# Patient Record
Sex: MALE | Race: BLACK OR AFRICAN AMERICAN | Employment: FULL TIME | URBAN - METROPOLITAN AREA
[De-identification: names, ages, dates, MRNs, and addresses within clinical notes are randomized per-mention and may not be internally consistent; named-entity substitution may affect disease eponyms.]

---

## 2017-12-07 ENCOUNTER — HOSPITAL ENCOUNTER (OUTPATIENT)
Dept: GENERAL RADIOLOGY | Age: 37
Discharge: HOME | End: 2017-12-07
Payer: SELF-PAY

## 2017-12-07 DIAGNOSIS — Z02.1 PRE-EMPLOYMENT EXAMINATION: ICD-10-CM

## 2017-12-07 PROCEDURE — 71020 XR CHEST PA LAT: CPT

## 2022-10-18 ENCOUNTER — HOSPITAL ENCOUNTER (OUTPATIENT)
Dept: LAB | Age: 42
Discharge: HOME | End: 2022-10-18

## 2022-10-18 ENCOUNTER — OFFICE VISIT (OUTPATIENT)
Dept: INTERNAL MEDICINE CLINIC | Age: 42
End: 2022-10-18
Payer: MEDICARE

## 2022-10-18 VITALS
BODY MASS INDEX: 46.23 KG/M2 | DIASTOLIC BLOOD PRESSURE: 126 MMHG | OXYGEN SATURATION: 100 % | WEIGHT: 305 LBS | TEMPERATURE: 96.8 F | HEART RATE: 91 BPM | HEIGHT: 68 IN | SYSTOLIC BLOOD PRESSURE: 175 MMHG

## 2022-10-18 DIAGNOSIS — E66.01 CLASS 3 SEVERE OBESITY WITH SERIOUS COMORBIDITY AND BODY MASS INDEX (BMI) OF 45.0 TO 49.9 IN ADULT, UNSPECIFIED OBESITY TYPE (HCC): ICD-10-CM

## 2022-10-18 DIAGNOSIS — I10 SEVERE UNCONTROLLED HYPERTENSION: ICD-10-CM

## 2022-10-18 DIAGNOSIS — M54.12 CERVICAL RADICULOPATHY: ICD-10-CM

## 2022-10-18 DIAGNOSIS — I10 SEVERE UNCONTROLLED HYPERTENSION: Primary | ICD-10-CM

## 2022-10-18 DIAGNOSIS — M48.02 FORAMINAL STENOSIS OF CERVICAL REGION: ICD-10-CM

## 2022-10-18 LAB
ALBUMIN SERPL-MCNC: 4.2 G/DL (ref 3.4–5)
ALBUMIN/GLOB SERPL: 1.3 {RATIO} (ref 0.8–1.7)
ALP SERPL-CCNC: 60 U/L (ref 45–117)
ALT SERPL-CCNC: 30 U/L (ref 16–61)
ANION GAP SERPL CALC-SCNC: 7 MMOL/L (ref 3–18)
APPEARANCE UR: CLEAR
AST SERPL-CCNC: 18 U/L (ref 10–38)
BASOPHILS # BLD: 0 K/UL (ref 0–0.1)
BASOPHILS NFR BLD: 0 % (ref 0–2)
BILIRUB SERPL-MCNC: 0.3 MG/DL (ref 0.2–1)
BILIRUB UR QL: NEGATIVE
BUN SERPL-MCNC: 12 MG/DL (ref 7–18)
BUN/CREAT SERPL: 11 (ref 12–20)
CALCIUM SERPL-MCNC: 9.1 MG/DL (ref 8.5–10.1)
CHLORIDE SERPL-SCNC: 108 MMOL/L (ref 100–111)
CHOLEST SERPL-MCNC: 165 MG/DL
CO2 SERPL-SCNC: 25 MMOL/L (ref 21–32)
COLOR UR: YELLOW
CREAT SERPL-MCNC: 1.14 MG/DL (ref 0.6–1.3)
DIFFERENTIAL METHOD BLD: ABNORMAL
EOSINOPHIL # BLD: 0.1 K/UL (ref 0–0.4)
EOSINOPHIL NFR BLD: 2 % (ref 0–5)
ERYTHROCYTE [DISTWIDTH] IN BLOOD BY AUTOMATED COUNT: 14 % (ref 11.6–14.5)
EST. AVERAGE GLUCOSE BLD GHB EST-MCNC: 114 MG/DL
GLOBULIN SER CALC-MCNC: 3.2 G/DL (ref 2–4)
GLUCOSE SERPL-MCNC: 74 MG/DL (ref 74–99)
GLUCOSE UR STRIP.AUTO-MCNC: NEGATIVE MG/DL
HBA1C MFR BLD: 5.6 % (ref 4.2–5.6)
HCT VFR BLD AUTO: 44.8 % (ref 36–48)
HDLC SERPL-MCNC: 49 MG/DL (ref 40–60)
HDLC SERPL: 3.4 {RATIO} (ref 0–5)
HGB BLD-MCNC: 14.5 G/DL (ref 13–16)
HGB UR QL STRIP: NEGATIVE
IMM GRANULOCYTES # BLD AUTO: 0 K/UL (ref 0–0.04)
IMM GRANULOCYTES NFR BLD AUTO: 1 % (ref 0–0.5)
KETONES UR QL STRIP.AUTO: NEGATIVE MG/DL
LDLC SERPL CALC-MCNC: 99.2 MG/DL (ref 0–100)
LEUKOCYTE ESTERASE UR QL STRIP.AUTO: NEGATIVE
LIPID PROFILE,FLP: NORMAL
LYMPHOCYTES # BLD: 1.5 K/UL (ref 0.9–3.6)
LYMPHOCYTES NFR BLD: 20 % (ref 21–52)
MCH RBC QN AUTO: 27.4 PG (ref 24–34)
MCHC RBC AUTO-ENTMCNC: 32.4 G/DL (ref 31–37)
MCV RBC AUTO: 84.5 FL (ref 78–100)
MONOCYTES # BLD: 0.6 K/UL (ref 0.05–1.2)
MONOCYTES NFR BLD: 7 % (ref 3–10)
NEUTS SEG # BLD: 5.5 K/UL (ref 1.8–8)
NEUTS SEG NFR BLD: 70 % (ref 40–73)
NITRITE UR QL STRIP.AUTO: NEGATIVE
NRBC # BLD: 0 K/UL (ref 0–0.01)
NRBC BLD-RTO: 0 PER 100 WBC
PH UR STRIP: 5 [PH] (ref 5–8)
PLATELET # BLD AUTO: 317 K/UL (ref 135–420)
PMV BLD AUTO: 9.9 FL (ref 9.2–11.8)
POTASSIUM SERPL-SCNC: 4.1 MMOL/L (ref 3.5–5.5)
PROT SERPL-MCNC: 7.4 G/DL (ref 6.4–8.2)
PROT UR STRIP-MCNC: NEGATIVE MG/DL
RBC # BLD AUTO: 5.3 M/UL (ref 4.35–5.65)
SODIUM SERPL-SCNC: 140 MMOL/L (ref 136–145)
SP GR UR REFRACTOMETRY: 1.02 (ref 1–1.03)
T4 FREE SERPL-MCNC: 1 NG/DL (ref 0.7–1.5)
TRIGL SERPL-MCNC: 84 MG/DL (ref ?–150)
TSH SERPL DL<=0.05 MIU/L-ACNC: 0.46 UIU/ML (ref 0.36–3.74)
UROBILINOGEN UR QL STRIP.AUTO: 0.2 EU/DL (ref 0.2–1)
VLDLC SERPL CALC-MCNC: 16.8 MG/DL
WBC # BLD AUTO: 7.8 K/UL (ref 4.6–13.2)

## 2022-10-18 PROCEDURE — 36415 COLL VENOUS BLD VENIPUNCTURE: CPT

## 2022-10-18 PROCEDURE — 80053 COMPREHEN METABOLIC PANEL: CPT

## 2022-10-18 PROCEDURE — 83036 HEMOGLOBIN GLYCOSYLATED A1C: CPT

## 2022-10-18 PROCEDURE — 84439 ASSAY OF FREE THYROXINE: CPT

## 2022-10-18 PROCEDURE — 84443 ASSAY THYROID STIM HORMONE: CPT

## 2022-10-18 PROCEDURE — 81003 URINALYSIS AUTO W/O SCOPE: CPT

## 2022-10-18 PROCEDURE — 80061 LIPID PANEL: CPT

## 2022-10-18 PROCEDURE — 85025 COMPLETE CBC W/AUTO DIFF WBC: CPT

## 2022-10-18 PROCEDURE — 99204 OFFICE O/P NEW MOD 45 MIN: CPT | Performed by: NURSE PRACTITIONER

## 2022-10-18 RX ORDER — LOSARTAN POTASSIUM AND HYDROCHLOROTHIAZIDE 12.5; 5 MG/1; MG/1
1 TABLET ORAL DAILY
Qty: 30 TABLET | Refills: 0 | Status: SHIPPED | OUTPATIENT
Start: 2022-10-18

## 2022-10-18 RX ORDER — GABAPENTIN 100 MG/1
100 CAPSULE ORAL 3 TIMES DAILY
Qty: 30 CAPSULE | Refills: 0 | Status: SHIPPED | OUTPATIENT
Start: 2022-10-18

## 2022-10-18 RX ORDER — CYCLOBENZAPRINE HCL 5 MG
5 TABLET ORAL
Qty: 30 TABLET | Refills: 0 | Status: SHIPPED | OUTPATIENT
Start: 2022-10-18 | End: 2022-10-18 | Stop reason: ALTCHOICE

## 2022-10-18 NOTE — PROGRESS NOTES
HISTORY OF PRESENT ILLNESS  Yoan Bush is a 40 y.o. male. Here to establish care with PMHX HTN  HPI  1) HTN - no meds  Current smoking black and milds - 2-3 a day  Alcohol drink occasional   NSAIDS use prn  Wt Readings from Last 3 Encounters:   10/18/22 305 lb (138.3 kg)     BP Readings from Last 3 Encounters:   10/18/22 (!) 175/126      2) Cervical pain - Left arm - gets weak, tingling occurs for a day or two - comes and goes - had EMG study but did not have results - pain in neck makes head hurt as well - Pain level 6/10 described as tingling in hand and shooting, sharp pain down arm. BP (!) 175/126   Pulse 91   Temp 96.8 °F (36 °C)   Ht 5' 8\" (1.727 m)   Wt 305 lb (138.3 kg)   SpO2 100%   BMI 46.38 kg/m²     Review of Systems   Constitutional:  Negative for chills, fever and malaise/fatigue. Eyes:  Positive for blurred vision and double vision. Respiratory:  Negative for cough, shortness of breath and wheezing. Cardiovascular:  Negative for chest pain, palpitations and leg swelling. Genitourinary:  Negative for frequency. Neurological:  Negative for dizziness and headaches. Endo/Heme/Allergies:  Negative for polydipsia. Physical Exam  Vitals and nursing note reviewed. Constitutional:       General: He is not in acute distress. Appearance: He is obese. He is not ill-appearing. HENT:      Head: Normocephalic. Right Ear: Tympanic membrane, ear canal and external ear normal.      Left Ear: Tympanic membrane, ear canal and external ear normal.      Nose: Nose normal.      Mouth/Throat:      Mouth: Mucous membranes are moist.      Pharynx: Oropharynx is clear. Comments: MASK  Eyes:      General: No scleral icterus. Extraocular Movements: Extraocular movements intact. Conjunctiva/sclera: Conjunctivae normal.      Pupils: Pupils are equal, round, and reactive to light. Cardiovascular:      Rate and Rhythm: Normal rate and regular rhythm.       Pulses: Normal pulses. Heart sounds: Normal heart sounds. Pulmonary:      Effort: Pulmonary effort is normal. No respiratory distress. Breath sounds: Normal breath sounds. No wheezing. Abdominal:      General: Abdomen is flat. Bowel sounds are normal. There is no distension. Palpations: Abdomen is soft. There is no mass. Tenderness: There is no abdominal tenderness. There is no guarding or rebound. Hernia: No hernia is present. Musculoskeletal:         General: Normal range of motion. Cervical back: Normal range of motion. Right lower leg: No edema. Left lower leg: No edema. Lymphadenopathy:      Cervical: No cervical adenopathy. Skin:     General: Skin is warm and dry. Findings: No lesion or rash. Neurological:      General: No focal deficit present. Mental Status: He is alert and oriented to person, place, and time. Sensory: Sensory deficit (C6-C7 left dermatone) present. Motor: Weakness (LUE) present. Psychiatric:         Mood and Affect: Mood normal.         Behavior: Behavior normal.         Thought Content: Thought content normal.         Judgment: Judgment normal.       ASSESSMENT and PLAN  Diagnoses and all orders for this visit:    1. Severe uncontrolled hypertension  -     REFERRAL TO OPHTHALMOLOGY  -     losartan-hydroCHLOROthiazide (HYZAAR) 50-12.5 mg per tablet; Take 1 Tablet by mouth daily.  -     METABOLIC PANEL, COMPREHENSIVE; Future  -     CBC WITH AUTOMATED DIFF; Future  -     LIPID PANEL; Future  -     T4, FREE; Future  -     TSH 3RD GENERATION; Future  -     URINALYSIS W/ RFLX MICROSCOPIC; Future  -     REFERRAL TO DIETITIAN    2. Class 3 severe obesity with serious comorbidity and body mass index (BMI) of 45.0 to 49.9 in adult, unspecified obesity type ([facility] [address]  -     HEMOGLOBIN A1C WITH EAG; Future  -     REFERRAL TO DIETITIAN    3.  Cervical radiculopathy  -     REFERRAL TO PHYSICIAL MEDICINE REHAB  -     gabapentin (NEURONTIN) 100 mg capsule; Take 1 Capsule by mouth three (3) times daily. Max Daily Amount: 300 mg. Indications: neuropathic pain    4. Foraminal stenosis of cervical region  -     REFERRAL TO PHYSICIAL MEDICINE REHAB  -     gabapentin (NEURONTIN) 100 mg capsule; Take 1 Capsule by mouth three (3) times daily. Max Daily Amount: 300 mg. Indications: neuropathic pain  HTN - not controlled - started losartan-HCTZ RTC in 4 weeks for evaluation - handout given on HTN  Referral to nutrition   Referral to ortho for cervical pain and radiculopathy - trial gabapentin QHS for pain   Follow-up and Dispositions    Return in about 4 weeks (around 11/15/2022) for HTN.

## 2022-10-18 NOTE — PROGRESS NOTES
Do Morgan is a 40 y.o. male (: 1980) presenting to address:    Chief Complaint   Patient presents with    New Patient              Vitals:    10/18/22 1012   Temp: 96.8 °F (36 °C)   Weight: 305 lb (138.3 kg)   Height: 5' 8\" (1.727 m)   PainSc:   0 - No pain       Hearing/Vision:   No results found. Learning Assessment:   No flowsheet data found. Depression Screening:     3 most recent PHQ Screens 10/18/2022   Little interest or pleasure in doing things Not at all   Feeling down, depressed, irritable, or hopeless Not at all   Total Score PHQ 2 0   Trouble falling or staying asleep, or sleeping too much Not at all   Feeling tired or having little energy Not at all   Poor appetite, weight loss, or overeating Not at all   Feeling bad about yourself - or that you are a failure or have let yourself or your family down Not at all   Trouble concentrating on things such as school, work, reading, or watching TV Not at all   Moving or speaking so slowly that other people could have noticed; or the opposite being so fidgety that others notice Not at all   Thoughts of being better off dead, or hurting yourself in some way Not at all   PHQ 9 Score 0   How difficult have these problems made it for you to do your work, take care of your home and get along with others Not difficult at all     Fall Risk Assessment:   No flowsheet data found. Abuse Screening:   No flowsheet data found. ADL Assessment:   No flowsheet data found. Coordination of Care Questionaire:   1. \"Have you been to the ER, urgent care clinic since your last visit? Hospitalized since your last visit? \" No    2. \"Have you seen or consulted any other health care providers outside of the [address] [place] [address] since your last visit? \" No     3. For patients aged 39-70: Has the patient had a colonoscopy / FIT/ Cologuard? NA - based on age      If the patient is female:    4.  For patients aged 41-77: Has the patient had a mammogram within the past 2 years? NA - based on age or sex  See top three    5. For patients aged 21-65: Has the patient had a pap smear? NA - based on age or sex    Advanced Directive:   1. Do you have an Advanced Directive? NO    2. Would you like information on Advanced Directives?  NO

## 2022-10-25 ENCOUNTER — TELEPHONE (OUTPATIENT)
Dept: INTERNAL MEDICINE CLINIC | Age: 42
End: 2022-10-25

## 2022-10-25 NOTE — TELEPHONE ENCOUNTER
----- Message from Wali Zheng sent at 10/24/2022  9:41 AM EDT -----  Subject: Message to Provider    QUESTIONS  Information for Provider? Patient had lost some [employer] ear buds(gray) in   the exam room on 10/18. He wants to know if anyone found them  ---------------------------------------------------------------------------  --------------  Abhishek MATOS  [phone]; OK to leave message on voicemail  ---------------------------------------------------------------------------  --------------  SCRIPT ANSWERS  Relationship to Patient?  Self

## 2022-11-11 DIAGNOSIS — I10 SEVERE UNCONTROLLED HYPERTENSION: ICD-10-CM

## 2022-11-14 RX ORDER — LOSARTAN POTASSIUM AND HYDROCHLOROTHIAZIDE 12.5; 5 MG/1; MG/1
TABLET ORAL
Qty: 30 TABLET | Refills: 0 | Status: SHIPPED | OUTPATIENT
Start: 2022-11-14

## 2022-12-13 ENCOUNTER — OFFICE VISIT (OUTPATIENT)
Dept: ORTHOPEDIC SURGERY | Age: 42
End: 2022-12-13
Payer: MEDICARE

## 2022-12-13 VITALS
HEIGHT: 68 IN | HEART RATE: 87 BPM | TEMPERATURE: 97.2 F | SYSTOLIC BLOOD PRESSURE: 141 MMHG | BODY MASS INDEX: 43.95 KG/M2 | OXYGEN SATURATION: 98 % | DIASTOLIC BLOOD PRESSURE: 102 MMHG | WEIGHT: 290 LBS

## 2022-12-13 DIAGNOSIS — R20.0 NUMBNESS AND TINGLING OF LEFT UPPER EXTREMITY: ICD-10-CM

## 2022-12-13 DIAGNOSIS — R20.2 NUMBNESS AND TINGLING OF LEFT UPPER EXTREMITY: ICD-10-CM

## 2022-12-13 DIAGNOSIS — M79.18 MYOFASCIAL PAIN: ICD-10-CM

## 2022-12-13 DIAGNOSIS — M67.912 TENDINOPATHY OF LEFT ROTATOR CUFF: ICD-10-CM

## 2022-12-13 DIAGNOSIS — M54.2 NECK PAIN: Primary | ICD-10-CM

## 2022-12-13 DIAGNOSIS — M54.2 NECK PAIN: ICD-10-CM

## 2022-12-13 RX ORDER — CYCLOBENZAPRINE HCL 5 MG
5 TABLET ORAL
COMMUNITY
Start: 2022-10-18

## 2022-12-13 RX ORDER — PREDNISONE 10 MG/1
10 TABLET ORAL SEE ADMIN INSTRUCTIONS
Qty: 1 DOSE PACK | Refills: 0 | Status: SHIPPED | OUTPATIENT
Start: 2022-12-13 | End: 2022-12-19

## 2022-12-13 NOTE — PROGRESS NOTES
[address]  [address], [address]  [place], [address] [place] [address] Street  Phone: [phone]  Fax: [phone]           Eduardo Wylie  : 1980  PCP: Earlene Dallas NP  2022    NEW PATIENT    HISTORY OF PRESENT ILLNESS  Jacqueline Bashir is a 40 y.o. male c/o neck pain that radiates intermittently down left arm and intermittent weakness of left hand. Notes of numbness and tingling when he raises his left arm. Reports he sleeps on his left side. When he rotates his neck, he notes of pain that radiates across whole neck. Notes of intermittent radiation of pain from neck to left hand, where second, third, and fourth digit experience the worse sxs. Pt noted that he had a Cervical MRI performed in  in [place], and he gave results to his PCP. We do not have the results on file, but pt brought in the physical films to examine today during OV. Pt notes that he saw a spine specialist in [place] in 2021, who was planning to have an BETZY performed for pt, but pt didn't hear anything back from them. Pt notes that when he went to the ED for chest pain on 21, he was told he may have nerve damage. Cervical MRI images dated 21. Per report , mild degenerative changes, no high-grade stenosis, no obvious acute abnormalities. Pt notes that he used to smoke a lot, but now he is gradually cutting down on smoking. Notes he was previously given a low dose of Gabapentin to take, but he doesn't remember the dose he was at. Pain Score: 8/10     PmHx: HTN      ASSESSMENT  Jacqueline Bashir is a 40 y.o. male with neck pain, left shoulder pain, and intermittent radiation to left hand in second, third, and fourth digit His symptoms may be due to a potential rotator cuff tendinopathy of left shoulder, with compensatory regional myofascial pain. He has a positive Hawkin's sign of L. There is weakness of his left ulnar ADM and slight muscle atrophy of left FDI.  The left hand is his dominant side. We will evaluate for ulnar mononeuropathy through LUE EMG for symptoms of numbness and tingling at pinky. Otherwise, patient is neurologically intact with normal strength and sensation. PLAN  LUE EMG  Prednisone 10 mg steroid pack x 6 days  Advised pt to perform rotator cuff exercises at home to relieve myofascial pain of left shoulder    Pt will f/u for EMG or sooner if needed. Diagnoses and all orders for this visit:    1. Neck pain    2. Numbness and tingling of left upper extremity    3. Tendinopathy of left rotator cuff    4. Myofascial pain         Follow-up and Dispositions    Return in about 6 weeks (around 1/24/2023) for LUE EMG followup. CHIEF COMPLAINT  Tereso Hudson is seen today in consultation at the request of Vasu Amaya NP for complaints of neck pain, let shoulder pain and numbness and tingling of LUE. PAST MEDICAL HISTORY   Past Medical History:   Diagnosis Date    HTN (hypertension)        History reviewed. No pertinent surgical history. MEDICATIONS      Current Outpatient Medications   Medication Sig Dispense Refill    cyclobenzaprine (FLEXERIL) 5 mg tablet Take 5 mg by mouth nightly. losartan-hydroCHLOROthiazide (HYZAAR) 50-12.5 mg per tablet TAKE 1 TABLET BY MOUTH EVERY DAY 30 Tablet 0    gabapentin (NEURONTIN) 100 mg capsule Take 1 Capsule by mouth three (3) times daily. Max Daily Amount: 300 mg.  Indications: neuropathic pain 30 Capsule 0       ALLERGIES    No Known Allergies       SOCIAL HISTORY    Social History     Socioeconomic History    Marital status: SINGLE   Tobacco Use    Smoking status: Every Day     Types: Cigarettes    Smokeless tobacco: Never   Vaping Use    Vaping Use: Never used   Substance and Sexual Activity    Alcohol use: Not Currently    Drug use: Not Currently    Sexual activity: Yes       FAMILY HISTORY    Family History   Problem Relation Age of Onset    Heart Disease Mother        REVIEW OF SYSTEMS  Review of Systems   Constitutional:  Negative for chills, fever and weight loss. Respiratory:  Negative for shortness of breath. Cardiovascular:  Negative for chest pain. Gastrointestinal:  Negative for constipation. Negative for fecal incontinence   Genitourinary:  Negative for dysuria. Negative for urinary incontinence   Musculoskeletal:  Positive for myalgias and neck pain (intermittent radiation down LUE). Skin:  Negative for rash. Neurological:  Positive for tingling (LUE). Negative for dizziness, tremors, focal weakness and headaches. Endo/Heme/Allergies:  Does not bruise/bleed easily. Psychiatric/Behavioral:  The patient does not have insomnia. PHYSICAL EXAMINATION  Visit Vitals  BP (!) 141/102 (BP 1 Location: Right upper arm, BP Patient Position: Sitting, BP Cuff Size: Large adult) Comment: pt symptomatic, MD aware   Pulse 87   Temp 97.2 °F (36.2 °C) (Temporal)   Ht 5' 8\" (1.727 m)   Wt 290 lb (131.5 kg)   SpO2 98%   BMI 44.09 kg/m²       Pain Assessment  12/13/2022   Location of Pain Neck; Shoulder;Arm   Location Modifiers Left   Severity of Pain 8   Quality of Pain Sharp;Burning; Other (Comment)   Quality of Pain Comment Numbness, Tingling   Duration of Pain Persistent   Frequency of Pain Constant   Date Pain First Started 1/12/2021   Aggravating Factors Stretching;Straightening;Bending; Other (Comment)   Aggravating Factors Comment Lying Down   Limiting Behavior Yes   Relieving Factors Nothing   Result of Injury No       Constitutional:  Well developed, well nourished, in no acute distress. Psychiatric: Affect and mood are appropriate. HEENT: Normocephalic, atraumatic. Extraocular movements intact. Integumentary: No rashes or abrasions noted on exposed areas. Cardiovascular: Regular rate and rhythm. Pulmonary: Clear to auscultation bilaterally. SPINE/MUSCULOSKELETAL EXAM    Cervical spine:  Neck is midline. Normal muscle tone. No focal atrophy is noted.    ROM pain upon right cervical ROM. Shoulder ROM intact. Negative Spurling's sign. Negative Tinel's sign. Negative Umanzor's sign. Sensation in the bilateral arms grossly intact to light touch. Other  Positive segundo' sign of L  Motor weakness of left ulnar ADM and slight atrophy of FDM    MOTOR:      Elbow Flex  Elbow Ext Arm Abd Wrist Ext Wrist Flex Hand Intrin   Right 5/5 5/5 5/5 5/5 5/5 5/5   Left 5/5 5/5 5/5 5/5 5/5 5/5             Hip flex  Knee Ext EHL Ankle DF Ankle PF      Right 5/5 5/5 5/5 5/5 5/5    Left 5/5 5/5 5/5 5/5 5/5      Negative Straight Leg raise. Squat not tested. No difficulty with tandem gait. Ambulation without assistive device. FWB. RADIOGRAPHS  Cervical MRI images taken on 9/24/22 were reviewed:  FINDINGS:   Vertebral bodies: Subtle concavity predominantly of the inferior endplates of multiple cervical vertebral bodies, predominantly centrally and posteriorly, probably reflext variation of normal anatomy. Subtle multilevel chronic compression deformities probably les likely as the etiology for this appearance. Marrow signal slightly decreased on the T1 sequences, though probably within. Disc spaces: Mild disc desiccation within cervical segments without significant loss    Craniocervical Junction/Cervical spinal cord: Right cerebellar tonsil terminates the level of foramen magnum, significant herniation. Cord signal within normal limits. Soft tissues: Unremarkable. LEVELS:    C2-3: Mild left facet arthropathy. No significant stenosis. C3-4:  Mild left facet arthropathy. No significant stenosis. C4-5: Trace disc osteophyte complex. Mild bilateral uncovertebral joint hypertrophy. Mild left facet arthropathy. No significant stenosis. C5-6: Mild facet arthropathy and uncovertebral joint hypertrophy. No signal stenosis. C6-7: Mild facet arthropathy. Mild uncovertebral joint hypertrophy. No significant stenosis.  Tiny left perineural cyst. C7-T1: Mild facet arthropathy. Mild right greater than left uncovertebral joint hypertrophy. No significant stenosis. IMPRESSION:  Mild degenerative changes. No high-grade stenosis. No obvious acute abnormalities. 31 minutes of face-to-face contact were spent with the patient during today's visit extensively discussing symptoms and treatment plan. All questions were answered. More than half of this visit today was spent on counseling. Written by Forest Byrd, as dictated by Dr. Sheba Okeefe.

## 2022-12-17 DIAGNOSIS — I10 SEVERE UNCONTROLLED HYPERTENSION: ICD-10-CM

## 2022-12-19 RX ORDER — LOSARTAN POTASSIUM AND HYDROCHLOROTHIAZIDE 12.5; 5 MG/1; MG/1
TABLET ORAL
Qty: 30 TABLET | Refills: 0 | Status: SHIPPED | OUTPATIENT
Start: 2022-12-19 | End: 2022-12-22 | Stop reason: SDUPTHER

## 2022-12-22 ENCOUNTER — OFFICE VISIT (OUTPATIENT)
Dept: INTERNAL MEDICINE CLINIC | Age: 42
End: 2022-12-22
Payer: MEDICARE

## 2022-12-22 VITALS
HEART RATE: 76 BPM | OXYGEN SATURATION: 96 % | RESPIRATION RATE: 18 BRPM | HEIGHT: 68 IN | WEIGHT: 303.4 LBS | SYSTOLIC BLOOD PRESSURE: 128 MMHG | BODY MASS INDEX: 45.98 KG/M2 | TEMPERATURE: 96.8 F | DIASTOLIC BLOOD PRESSURE: 90 MMHG

## 2022-12-22 DIAGNOSIS — Z23 ENCOUNTER FOR IMMUNIZATION: ICD-10-CM

## 2022-12-22 DIAGNOSIS — I10 SEVERE UNCONTROLLED HYPERTENSION: ICD-10-CM

## 2022-12-22 DIAGNOSIS — E78.5 HYPERLIPIDEMIA, UNSPECIFIED HYPERLIPIDEMIA TYPE: ICD-10-CM

## 2022-12-22 DIAGNOSIS — M48.02 FORAMINAL STENOSIS OF CERVICAL REGION: ICD-10-CM

## 2022-12-22 DIAGNOSIS — I10 ESSENTIAL HYPERTENSION: Primary | ICD-10-CM

## 2022-12-22 DIAGNOSIS — I25.10 ASCVD (ARTERIOSCLEROTIC CARDIOVASCULAR DISEASE): ICD-10-CM

## 2022-12-22 DIAGNOSIS — M54.12 CERVICAL RADICULOPATHY: ICD-10-CM

## 2022-12-22 DIAGNOSIS — Z11.59 ENCOUNTER FOR HEPATITIS C SCREENING TEST FOR LOW RISK PATIENT: ICD-10-CM

## 2022-12-22 PROCEDURE — G0009 ADMIN PNEUMOCOCCAL VACCINE: HCPCS | Performed by: INTERNAL MEDICINE

## 2022-12-22 PROCEDURE — G8417 CALC BMI ABV UP PARAM F/U: HCPCS | Performed by: INTERNAL MEDICINE

## 2022-12-22 PROCEDURE — G8432 DEP SCR NOT DOC, RNG: HCPCS | Performed by: INTERNAL MEDICINE

## 2022-12-22 PROCEDURE — 99204 OFFICE O/P NEW MOD 45 MIN: CPT | Performed by: INTERNAL MEDICINE

## 2022-12-22 PROCEDURE — 3080F DIAST BP >= 90 MM HG: CPT | Performed by: INTERNAL MEDICINE

## 2022-12-22 PROCEDURE — 3074F SYST BP LT 130 MM HG: CPT | Performed by: INTERNAL MEDICINE

## 2022-12-22 PROCEDURE — 90677 PCV20 VACCINE IM: CPT | Performed by: INTERNAL MEDICINE

## 2022-12-22 PROCEDURE — G8427 DOCREV CUR MEDS BY ELIG CLIN: HCPCS | Performed by: INTERNAL MEDICINE

## 2022-12-22 RX ORDER — LOSARTAN POTASSIUM AND HYDROCHLOROTHIAZIDE 12.5; 5 MG/1; MG/1
1 TABLET ORAL DAILY
Qty: 90 TABLET | Refills: 1 | Status: SHIPPED | OUTPATIENT
Start: 2022-12-22

## 2022-12-22 RX ORDER — ATORVASTATIN CALCIUM 20 MG/1
20 TABLET, FILM COATED ORAL DAILY
Qty: 90 TABLET | Refills: 1 | Status: SHIPPED | OUTPATIENT
Start: 2022-12-22

## 2022-12-22 RX ORDER — GABAPENTIN 100 MG/1
100 CAPSULE ORAL 3 TIMES DAILY
Qty: 30 CAPSULE | Refills: 0 | Status: CANCELLED | OUTPATIENT
Start: 2022-12-22

## 2022-12-22 RX ORDER — CYCLOBENZAPRINE HCL 5 MG
5 TABLET ORAL
Status: CANCELLED | OUTPATIENT
Start: 2022-12-22

## 2022-12-22 NOTE — PROGRESS NOTES
HISTORY OF PRESENT ILLNESS  Reed Mesa is a 40 y.o. male. Patient comes to establish PCP. Patient will continue to follow-up with me since prior provider will change practice. He has no complaints or concerns. Essential hypertension  On losartan/hydrochlorothiazide. Recently diagnosed, started on medication. Blood pressure improved compared to get last appointment but still not optimal.  Will check blood pressure, let me know in a few days. Hyperlipidemia  ASCVD recommend mod/high dose statins. Started atorvastatin 20 mg. Left shoulder, likely nerve compression related  Gabapentin and flexeril did not help. Now following spine doctor. Tobacco abuse disorder/former smoker  Started to stop smoking in 10/2022. 1 ppd (25 years total). Now smoking 1/2 ppd. Will go down to 1/2 pp week from today. No willing to try meds or nicotine patches/gum yet. Obesity morbid  BMI 46. 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 He will ask for advice. Information provided. PAST MEDICAL HISTORY  Medical: HTN. Surgical:  Allergies:  Medication:  Family: father and grandfather COPD and lung cancer and smoker. Work:  Social:  Sexual:          Hypertension   Pertinent negatives include no chest pain, no palpitations and no shortness of breath. Review of Systems   Constitutional:  Negative for chills and fever. HENT:  Negative for congestion. Respiratory:  Negative for cough and shortness of breath. Cardiovascular:  Negative for chest pain, palpitations and leg swelling. Visit Vitals  BP (!) 128/90 (BP 1 Location: Left upper arm, BP Patient Position: Sitting)   Pulse 76   Temp 96.8 °F (36 °C) (Temporal)   Resp 18   Ht 5' 8\" (1.727 m)   Wt 303 lb 6.4 oz (137.6 kg)   SpO2 96%   BMI 46.13 kg/m²     Physical Exam  Constitutional:       Appearance: Normal appearance. HENT:      Mouth/Throat:      Mouth: Mucous membranes are moist.      Pharynx: Oropharynx is clear.    Eyes:      Extraocular Movements: Extraocular movements intact. Conjunctiva/sclera: Conjunctivae normal.      Pupils: Pupils are equal, round, and reactive to light. Cardiovascular:      Rate and Rhythm: Normal rate and regular rhythm. Pulses: Normal pulses. Heart sounds: Normal heart sounds. Pulmonary:      Effort: Pulmonary effort is normal.      Breath sounds: Normal breath sounds. Abdominal:      General: Bowel sounds are normal.      Palpations: Abdomen is soft. Musculoskeletal:      Cervical back: Normal range of motion. Lymphadenopathy:      Cervical: No cervical adenopathy. Skin:     General: Skin is warm. Neurological:      Mental Status: He is alert and oriented to person, place, and time. Psychiatric:         Mood and Affect: Mood normal.       ASSESSMENT and PLAN    ICD-10-CM ICD-9-CM    1. Hyperlipidemia, unspecified hyperlipidemia type  E78.5 272.4       2. Cervical radiculopathy  M54.12 723.4       3. Foraminal stenosis of cervical region  M48.02 723.0       4. Severe uncontrolled hypertension  I10 401.9 losartan-hydroCHLOROthiazide (HYZAAR) 50-12.5 mg per tablet      5. ASCVD (arteriosclerotic cardiovascular disease)  I25.10 429.2 atorvastatin (LIPITOR) 20 mg tablet     440.9       6. Encounter for immunization  Z23 V03.89 PNEUMOCOCCAL, PCV20, PREVNAR 20, (AGE 18 YRS+), IM, PF      7. Encounter for hepatitis C screening test for low risk patient  Z11.59 V73.89 HEPATITIS C AB        Follow-up and Dispositions    Return in about 3 months (around 3/22/2023) for weight, HTN. Patient comes to establish PCP. ASCVD recommend mod/high dose statins. Started atorvastatin, refilled. Pneumococcal vaccine today. Screen test.    Follow-up in 3 months for weight control and smoking cessation as well as monitoring hypertension.

## 2022-12-22 NOTE — PROGRESS NOTES
Scott Barnes is a 40 y.o.  male presents today for office visit for   Chief Complaint   Patient presents with    Hypertension   . Pt is not fasting. Patient is accompanied by self. I have received verbal consent from Scott Barnes to discuss any/all medical information while they are present in the room 9. Scott Barnes preferred language for health care discussion is english. Is the patient using any DME equipment during OV? NO    Advance Directive:  1. Do you have an advance directive in place? Patient Reply: No    2. If not, would you like material regarding how to put one in place? No      1. \"Have you been to the ER, urgent care clinic since your last visit? Hospitalized since your last visit? \" No    2. \"Have you seen or consulted any other health care providers outside of the [address] [place] [address] since your last visit? \" Yes Eye surgeon and Spine doctor      3. For patients aged 39-70: Has the patient had a colonoscopy? No     If the patient is female:    4. For patients aged 41-77: Has the patient had a mammogram within the past 2 years? NA - based on age    11. For patients aged 21-65: Has the patient had a pap smear? NA - based on age    Upcoming Appts  No    VORB: No orders of the defined types were placed in this encounter.  Stormy Yu MD/[facility] [place]    Scott Barnes is due for:   Health Maintenance Due   Topic    Hepatitis C Screening     COVID-19 Vaccine (1)    Pneumococcal 0-64 years (1 - PCV)    DTaP/Tdap/Td series (1 - Tdap)    Flu Vaccine (1)    Medicare Yearly Exam      Health Maintenance reviewed and discussed per provider  Please order/place referral if appropriate. Requested Prescriptions     Pending Prescriptions Disp Refills    cyclobenzaprine (FLEXERIL) 5 mg tablet       Sig: Take 1 Tablet by mouth nightly.    gabapentin (NEURONTIN) 100 mg capsule 30 Capsule 0     Sig: Take 1 Capsule by mouth three (3) times daily. Max Daily Amount: 300 mg.  Indications: neuropathic pain       Learning Assessment:  No flowsheet data found. Depression Screening:  3 most recent PHQ Screens 12/22/2022 10/18/2022   Little interest or pleasure in doing things Not at all Not at all   Feeling down, depressed, irritable, or hopeless Not at all Not at all   Total Score PHQ 2 0 0   Trouble falling or staying asleep, or sleeping too much Not at all Not at all   Feeling tired or having little energy Not at all Not at all   Poor appetite, weight loss, or overeating Not at all Not at all   Feeling bad about yourself - or that you are a failure or have let yourself or your family down Not at all Not at all   Trouble concentrating on things such as school, work, reading, or watching TV Not at all Not at all   Moving or speaking so slowly that other people could have noticed; or the opposite being so fidgety that others notice Not at all Not at all   Thoughts of being better off dead, or hurting yourself in some way Not at all Not at all   PHQ 9 Score 0 0   How difficult have these problems made it for you to do your work, take care of your home and get along with others Not difficult at all Not difficult at all     Abuse Screening:  Abuse Screening Questionnaire 12/22/2022   Do you ever feel afraid of your partner? N   Are you in a relationship with someone who physically or mentally threatens you? N   Is it safe for you to go home? Y     Fall Risk  No flowsheet data found. Recent Travel Screening and Travel History documentation     Travel Screening       Question Response    In the last 10 days, have you been in contact with someone who was confirmed or suspected to have Coronavirus/COVID-19? Yes    Have you had a COVID-19 viral test in the last 10 days? No    Do you have any of the following new or worsening symptoms? None of these    Have you traveled internationally or domestically in the last month?  No          Travel History   Travel since 11/22/22    No documented travel since 11/22/22 Payal Booth is a 40 y.o. male who presents for Pneumococcal immunization. He denies any symptoms , reactions or allergies that would exclude them from being immunized today. Risks and adverse reactions were discussed and the VIS was given to them. All questions were addressed. He was observed for 15 min post injection. There were no reactions observed.     Holley Peraza

## 2022-12-22 NOTE — PATIENT INSTRUCTIONS
I suggest that you continue to implement healthy lifestyle changes with modifications in your diet by cutting out refined sugars, substituting white bread for whole wheat and eating more fresh vegetables. In addition you should work on getting 150 min of aerobic exercise a week, which is 30 minutes walking daily.

## 2023-03-15 ENCOUNTER — PROCEDURE VISIT (OUTPATIENT)
Age: 43
End: 2023-03-15

## 2023-03-15 VITALS
TEMPERATURE: 97 F | HEART RATE: 88 BPM | WEIGHT: 303 LBS | BODY MASS INDEX: 45.92 KG/M2 | HEIGHT: 68 IN | OXYGEN SATURATION: 99 %

## 2023-03-15 DIAGNOSIS — G56.02 CARPAL TUNNEL SYNDROME OF LEFT WRIST: ICD-10-CM

## 2023-03-15 DIAGNOSIS — R20.2 NUMBNESS AND TINGLING OF LEFT UPPER EXTREMITY: Primary | ICD-10-CM

## 2023-03-15 DIAGNOSIS — R20.0 NUMBNESS AND TINGLING OF LEFT UPPER EXTREMITY: Primary | ICD-10-CM

## 2023-03-15 DIAGNOSIS — M67.912 TENDINOPATHY OF LEFT ROTATOR CUFF: ICD-10-CM

## 2023-03-15 DIAGNOSIS — R94.131 ABNORMAL EMG: ICD-10-CM

## 2023-03-15 DIAGNOSIS — M54.2 CERVICAL PAIN: ICD-10-CM

## 2023-03-15 DIAGNOSIS — M79.18 MYOFASCIAL PAIN: ICD-10-CM

## 2023-03-15 RX ORDER — AMLODIPINE BESYLATE 5 MG/1
TABLET ORAL DAILY
COMMUNITY
Start: 2021-08-02

## 2023-03-15 ASSESSMENT — PATIENT HEALTH QUESTIONNAIRE - PHQ9
SUM OF ALL RESPONSES TO PHQ QUESTIONS 1-9: 0
SUM OF ALL RESPONSES TO PHQ9 QUESTIONS 1 & 2: 0
SUM OF ALL RESPONSES TO PHQ QUESTIONS 1-9: 0
1. LITTLE INTEREST OR PLEASURE IN DOING THINGS: 0
2. FEELING DOWN, DEPRESSED OR HOPELESS: 0

## 2023-03-15 NOTE — PROGRESS NOTES
[address]  [address], [address]  [address]  Phone: [phone]  Fax: [phone]    Carla Leon  : 1980  PCP: Santos Moore MD  3/15/2023      ELECTROMYOGRAPHY AND NERVE CONDUCTION STUDIES     Avis Alonso was referred by me for electrodiagnostic evaluation of numbness and tingling of LUE. NCV & EMG Findings:  Evaluation of the left median-ulnar (dig IV) sensory nerve showed abnormal peak latency difference ((Median Wrist)-(Ulnar Wrist), 0.70 ms). All remaining nerves (as indicated in the following tables) were within normal limits. INTERPRETATION  This is an abnormal electrodiagnostic examination. These findings may be consistent with:  Mild median mononeuropathy at the left wrist (carpal tunnel syndrome)      There are no electrodiagnostic findings consistent with cervical radiculopathy, brachial plexopathy, myopathy, polyneuropathy or any other mononeuropathy. CLINICAL INTERPRETATION/ASSESSMENT  His electrodiagnostic finding of left median mononeuropathy at the wrist appears consistent with his left arm symptoms. Avis Alonso is a 44 y.o. male with neck pain, left shoulder pain, and intermittent radiation to left hand in second, third, and fourth digit. His symptoms may be due to a potential left rotator cuff tendinopathy, with compensatory regional myofascial pain. He presents with positive Hawkin's sign of L. There is weakness of his left ulnar ADM and slight muscle atrophy of left FDI. We ruled out ulnar mononeuropathy through EMG evaluation today, but findings did reveal left CTS. PLAN  Referral to Dr. Larry Desai - eval of numbness and tingling of LUE; further eval of left CTS  Referral to PT - eval and treat of neck, left shoulder, and LUE pain  We may further evaluate through updated Cervical MRI, as most recent MRI was in 2021.      Follow-up and Dispositions    Return in about 8 weeks (around 5/10/2023) for PT follow up. Shahbaz Sanchez was seen today for arm pain. Diagnoses and all orders for this visit:    Numbness and tingling of left upper extremity    Cervical pain    Myofascial pain    Abnormal EMG    Carpal tunnel syndrome of left wrist    Tendinopathy of left rotator cuff       HISTORY OF PRESENT ILLNESS     12/13/22  Neck pain that radiates intermittently down left arm and intermittent weakness of left hand  Cervical MRI images dated 9/24/21. Per report, mild degenerative changes, no high-grade stenosis, no obvious acute abnormalities. Pt notes that he used to smoke a lot, but now he is gradually cutting  down on smoking. Notes he was previously given a low dose of Gabapentin to take, but he doesn't remember the dose he was at. Arsenio Osuna is a 44 y.o. male who presents today with LUE EMG evaluation for numbness and tingling of his left hand, especially in second, third and fourth digits. Notes onset of numbness and tingling occurs when driving or when he lifts his left arm above his head. His left hand is his dominant hand. PmHx: HTN     PAST MEDICAL HISTORY   No past medical history on file. No past surgical history on file. MEDICATIONS      Current Outpatient Medications   Medication Sig Dispense Refill    amLODIPine (NORVASC) 5 MG tablet Take by mouth daily      atorvastatin (LIPITOR) 20 MG tablet Take 20 mg by mouth daily      losartan-hydroCHLOROthiazide (HYZAAR) 50-12.5 MG per tablet Take 1 tablet by mouth daily       No current facility-administered medications for this visit.        ALLERGIES    No Known Allergies       SOCIAL HISTORY    Social History     Socioeconomic History    Marital status:      Spouse name: None    Number of children: None    Years of education: None    Highest education level: None   Tobacco Use    Smoking status: Some Days     Types: Cigarettes     Passive exposure: Never    Smokeless tobacco: Never     Social Determinants of Health Financial Resource Strain: Low Risk     Difficulty of Paying Living Expenses: Not hard at all   Food Insecurity: No Food Insecurity    Worried About Running Out of Food in the Last Year: Never true    Ran Out of Food in the Last Year: Never true       FAMILY HISTORY    No family history on file. PHYSICAL EXAMINATION  Pulse 88   Temp 97 °F (36.1 °C) (Temporal)   Ht 5' 8\" (1.727 m)   Wt (!) 303 lb (137.4 kg)   SpO2 99%   BMI 46.07 kg/m²        AMB PAIN ASSESSMENT 3/15/2023   Location of Pain Arm   Severity of Pain 5   Quality of Pain Throbbing   Duration of Pain Persistent   Frequency of Pain Constant   Aggravating Factors Other (Comment)   Limiting Behavior Some   Relieving Factors Other (Comment)   Result of Injury No   Work-Related Injury No   Are there other pain locations you wish to document? No             Constitutional:  Well developed, well nourished, in no acute distress. Psychiatric: Affect and mood are appropriate. Integumentary: No rashes or abrasions noted on exposed areas.          SPINE/MUSCULOSKELETAL EXAM     On brief examination:  Positive Hawkin's sign of L.        NCV & EMG Findings:  NCS+  Motor Nerve Results      Latency Amplitude F-Lat Segment Distance CV Comment   Site (ms) Norm (mV) Norm (ms)  (cm) (m/s) Norm    Left Median (APB) Motor   Wrist 3.9  < 4.6 9.5  > 4.2  Wrist-APB 8      Elbow 9.0 - 8.4 -  Elbow-Wrist 27 53  > 47    Left Ulnar (ADM) Motor   Wrist 2.8  < 3.7 15.5  > 7.9  Wrist-ADM 8      Bel Elbow 6.8 - 12.3 -  Bel Elbow-Wrist 23.5 59  > 52    Abv Elbow 8.2 - 14.6 -  Abv Elbow-Bel Elbow 10 71  > 43      Sensory Sites       Latency (Onset) Latency (Peak)  Amplitude (O-P) Segment Distance CV (Onset) Comment   Site ms Norm (ms) Norm µV Norm  mm m/s Norm    Left Median Sensory   Wrist-Dig II 2.6  < 3.3 3.4  < 4.0 23  > 11 Wrist-Dig II 14 54 -    Left Median-Ulnar (Dig IV) Sensory        Median   Wrist-Dig IV 3.0 - 3.6  < 4.1 8 - Wrist-Dig IV 14 47 -         Ulnar Wrist-Dig IV 2.2 - 2.9  < 3.9 7 - Wrist-Dig IV 14 64 -    Left Radial Sensory   Forearm-Wrist 1.60  < 2.2 2.1  < 2.8 44  > 7 Forearm-Wrist 10 63 -    Left Ulnar Sensory   Wrist-Dig V 2.2  < 3.1 2.8  < 4.0 26  > 11 Wrist-Dig V 14 64 -      Inter-Nerve Comparisons     Nerve 1 Value 1 Nerve 2 Value 2 Parameter Result Normal   Sensory Sites   L Median Wrist-Dig IV 3.6 ms L Ulnar Wrist-Dig IV 2.9 ms Peak Lat Diff 0.70 ms <0.40     EMG+     Side Muscle Nerve Root Ins Act Fibs Psw Fascics Other Amp Dur Poly Recrt Activation Comment Misc   Left Biceps Musculocut C5-C6 Nml Nml Nml Nml 0 Nml Nml 0 Nml Nml     Left Triceps Radial C6-C8 Nml Nml Nml Nml 0 Nml Nml 0 Nml Nml     Left Pronator Teres Median C6-C7 Nml Nml Nml Nml 0 Nml Nml 0 Nml Nml     Left FDI Median,  Ulnar C8-T1 Nml Nml Nml Nml 0 Nml Nml 0 Nml Nml     Left APB Median C8-T1 Nml Nml Nml Nml 0 Nml Nml 0 Nml Nml     Left EDC Radial,  Posterior In. ..  C7-C8 Nml Nml Nml Nml 0 Nml Nml 0 Nml Nml     Left Cervical Parasp (Upper) Rami C1-C3 Nml Nml Nml Nml 0          Left Cervical Parasp (Mid) Rami C4-C6 Nml Nml Nml Nml 0          Left Cervical Parasp (Lower) Rami C7-C8 Nml Nml Nml Nml 0                  Waveforms:    Motor         Sensory                              [facility]  OFFICE PROCEDURE PROGRESS NOTE           Chart reviewed for the following:              Donald CONNER, have reviewed the History, Physical and updated the Allergic reactions for [address] performed immediately prior to start of procedure:              Donald CONNER, have performed the following reviews on [address] prior to the start of the procedure:            * Patient was identified by name and date of birth   * Agreement on procedure being performed was verified  * Risks and Benefits explained to the patient  * Procedure site verified and marked as necessary  * Patient was positioned for comfort  * Consent was signed and verified                Time:  11:34 AM      Date of procedure: 3/15/2023      Procedure performed by:  Key Arteaga MD     Patient accompanied by another individual: No    How tolerated by patient: tolerated the procedure well with no complications    Post Procedural Pain Scale: 0 - No Hurt    Comments: none     Written by Krista Perera as dictated by Lesvia Baker MD

## 2023-04-21 ENCOUNTER — OFFICE VISIT (OUTPATIENT)
Facility: CLINIC | Age: 43
End: 2023-04-21
Payer: COMMERCIAL

## 2023-04-21 VITALS
DIASTOLIC BLOOD PRESSURE: 81 MMHG | TEMPERATURE: 97 F | HEART RATE: 77 BPM | HEIGHT: 68 IN | WEIGHT: 305.4 LBS | OXYGEN SATURATION: 93 % | SYSTOLIC BLOOD PRESSURE: 128 MMHG | BODY MASS INDEX: 46.29 KG/M2 | RESPIRATION RATE: 18 BRPM

## 2023-04-21 DIAGNOSIS — E78.5 HYPERLIPIDEMIA, UNSPECIFIED HYPERLIPIDEMIA TYPE: ICD-10-CM

## 2023-04-21 DIAGNOSIS — E66.01 OBESITY, CLASS III, BMI 40-49.9 (MORBID OBESITY) (HCC): ICD-10-CM

## 2023-04-21 DIAGNOSIS — I10 ESSENTIAL HYPERTENSION: Primary | ICD-10-CM

## 2023-04-21 DIAGNOSIS — Z11.59 ENCOUNTER FOR HEPATITIS C SCREENING TEST FOR LOW RISK PATIENT: ICD-10-CM

## 2023-04-21 DIAGNOSIS — Z11.4 SCREENING FOR HIV (HUMAN IMMUNODEFICIENCY VIRUS): ICD-10-CM

## 2023-04-21 DIAGNOSIS — Z23 ENCOUNTER FOR VACCINATION: ICD-10-CM

## 2023-04-21 PROCEDURE — 3079F DIAST BP 80-89 MM HG: CPT | Performed by: INTERNAL MEDICINE

## 2023-04-21 PROCEDURE — 90715 TDAP VACCINE 7 YRS/> IM: CPT | Performed by: INTERNAL MEDICINE

## 2023-04-21 PROCEDURE — 99214 OFFICE O/P EST MOD 30 MIN: CPT | Performed by: INTERNAL MEDICINE

## 2023-04-21 PROCEDURE — 3074F SYST BP LT 130 MM HG: CPT | Performed by: INTERNAL MEDICINE

## 2023-04-21 PROCEDURE — 90471 IMMUNIZATION ADMIN: CPT | Performed by: INTERNAL MEDICINE

## 2023-04-21 RX ORDER — AMLODIPINE, VALSARTAN AND HYDROCHLOROTHIAZIDE 10; 160; 12.5 MG/1; MG/1; MG/1
1 TABLET ORAL DAILY
Qty: 90 TABLET | Refills: 0 | Status: SHIPPED | OUTPATIENT
Start: 2023-04-21 | End: 2023-04-21

## 2023-04-21 RX ORDER — ROSUVASTATIN CALCIUM 10 MG/1
10 TABLET, COATED ORAL DAILY
Qty: 90 TABLET | Refills: 0 | Status: SHIPPED | OUTPATIENT
Start: 2023-04-21 | End: 2023-04-21

## 2023-04-21 RX ORDER — LOSARTAN POTASSIUM AND HYDROCHLOROTHIAZIDE 12.5; 5 MG/1; MG/1
1 TABLET ORAL DAILY
Qty: 90 TABLET | Refills: 3 | Status: SHIPPED | OUTPATIENT
Start: 2023-04-21

## 2023-04-21 RX ORDER — AMLODIPINE BESYLATE 5 MG/1
5 TABLET ORAL DAILY
Qty: 90 TABLET | Refills: 3 | Status: SHIPPED | OUTPATIENT
Start: 2023-04-21

## 2023-04-21 RX ORDER — VARDENAFIL HYDROCHLORIDE 5 MG/1
5 TABLET ORAL PRN
Qty: 10 TABLET | Refills: 1 | Status: SHIPPED | OUTPATIENT
Start: 2023-04-21 | End: 2023-04-21

## 2023-04-21 RX ORDER — ATORVASTATIN CALCIUM 20 MG/1
20 TABLET, FILM COATED ORAL DAILY
Qty: 90 TABLET | Refills: 3 | Status: SHIPPED | OUTPATIENT
Start: 2023-04-21

## 2023-04-21 SDOH — ECONOMIC STABILITY: FOOD INSECURITY: WITHIN THE PAST 12 MONTHS, THE FOOD YOU BOUGHT JUST DIDN'T LAST AND YOU DIDN'T HAVE MONEY TO GET MORE.: NEVER TRUE

## 2023-04-21 SDOH — ECONOMIC STABILITY: FOOD INSECURITY: WITHIN THE PAST 12 MONTHS, YOU WORRIED THAT YOUR FOOD WOULD RUN OUT BEFORE YOU GOT MONEY TO BUY MORE.: NEVER TRUE

## 2023-04-21 SDOH — ECONOMIC STABILITY: HOUSING INSECURITY
IN THE LAST 12 MONTHS, WAS THERE A TIME WHEN YOU DID NOT HAVE A STEADY PLACE TO SLEEP OR SLEPT IN A SHELTER (INCLUDING NOW)?: NO

## 2023-04-21 SDOH — ECONOMIC STABILITY: INCOME INSECURITY: HOW HARD IS IT FOR YOU TO PAY FOR THE VERY BASICS LIKE FOOD, HOUSING, MEDICAL CARE, AND HEATING?: NOT HARD AT ALL

## 2023-04-21 NOTE — PROGRESS NOTES
Raven Albright is a 44 y.o.  male presents today for office visit for   Chief Complaint   Patient presents with    Health Maintenance    Hypertension   . 1. \"Have you been to the ER, urgent care clinic since your last visit? Hospitalized since your last visit? \" No    2. \"Have you seen or consulted any other health care providers outside of the [address] [place] [address] since your last visit? \" No     3. For patients aged 39-70: Has the patient had a colonoscopy / FIT/ Cologuard? N/A     If the patient is female:    4. For patients aged 41-77: Has the patient had a mammogram within the past 2 years? N/A    5. For patients aged 21-65: Has the patient had a pap smear?  N/A
Z11.4 HIV 1/2 Ag/Ab, 4TH Generation,W Rflx Confirm     HIV 1/2 Ag/Ab, 4TH Generation,W Rflx Confirm      5. Encounter for hepatitis C screening test for low risk patient  Z11.59 Hepatitis C Antibody     Hepatitis C Antibody      6. Obesity, Class III, BMI 40-49.9 (morbid obesity) ([facility] [address]  E66.01 [facility] Prashanth Dsouza CNP, Weight Loss, [place]              Plan:   Return in about 6 months (around 10/21/2023) for Annual exam, Refills.       Yemi Galvan MD

## 2023-04-22 LAB
HCV IGG SERPL QL IA: NON REACTIVE
HIV 1+2 AB+HIV1 P24 AG SERPL QL IA: NON REACTIVE

## 2023-07-28 ENCOUNTER — OFFICE VISIT (OUTPATIENT)
Age: 43
End: 2023-07-28
Payer: COMMERCIAL

## 2023-07-28 VITALS
SYSTOLIC BLOOD PRESSURE: 135 MMHG | HEIGHT: 68 IN | TEMPERATURE: 98 F | RESPIRATION RATE: 16 BRPM | OXYGEN SATURATION: 94 % | DIASTOLIC BLOOD PRESSURE: 85 MMHG | HEART RATE: 66 BPM | WEIGHT: 303 LBS | BODY MASS INDEX: 45.92 KG/M2

## 2023-07-28 DIAGNOSIS — E78.5 HYPERLIPIDEMIA, UNSPECIFIED HYPERLIPIDEMIA TYPE: ICD-10-CM

## 2023-07-28 DIAGNOSIS — Z71.89 ENCOUNTER FOR PRE-BARIATRIC SURGERY COUNSELING AND EDUCATION: ICD-10-CM

## 2023-07-28 DIAGNOSIS — Z72.0 TOBACCO CONSUMPTION: ICD-10-CM

## 2023-07-28 DIAGNOSIS — E66.01 MORBID OBESITY (HCC): Primary | ICD-10-CM

## 2023-07-28 DIAGNOSIS — I10 HYPERTENSION, UNSPECIFIED TYPE: ICD-10-CM

## 2023-07-28 DIAGNOSIS — K21.9 GASTROESOPHAGEAL REFLUX DISEASE, UNSPECIFIED WHETHER ESOPHAGITIS PRESENT: ICD-10-CM

## 2023-07-28 PROCEDURE — 3075F SYST BP GE 130 - 139MM HG: CPT | Performed by: SURGERY

## 2023-07-28 PROCEDURE — 99204 OFFICE O/P NEW MOD 45 MIN: CPT | Performed by: SURGERY

## 2023-07-28 PROCEDURE — 3079F DIAST BP 80-89 MM HG: CPT | Performed by: SURGERY

## 2023-07-28 NOTE — PROGRESS NOTES
Viri Crowell is a 44 y.o. male (: 1980)     Chief Complaint   Patient presents with    New Patient     Bariatric consult       Medication list and allergies have been reviewed with Viri Crowell and updated as of today's date. I have gone over all Medical, Surgical and Social History with Viri Socrates and updated/added the information accordingly.

## 2024-02-08 ENCOUNTER — OFFICE VISIT (OUTPATIENT)
Facility: CLINIC | Age: 44
End: 2024-02-08
Payer: COMMERCIAL

## 2024-02-08 VITALS
TEMPERATURE: 96.9 F | BODY MASS INDEX: 46.07 KG/M2 | HEIGHT: 68 IN | HEART RATE: 99 BPM | OXYGEN SATURATION: 97 % | SYSTOLIC BLOOD PRESSURE: 153 MMHG | RESPIRATION RATE: 15 BRPM | WEIGHT: 304 LBS | DIASTOLIC BLOOD PRESSURE: 92 MMHG

## 2024-02-08 DIAGNOSIS — G89.29 CHRONIC LEFT SHOULDER PAIN: Primary | ICD-10-CM

## 2024-02-08 DIAGNOSIS — M25.512 CHRONIC LEFT SHOULDER PAIN: Primary | ICD-10-CM

## 2024-02-08 PROCEDURE — 3077F SYST BP >= 140 MM HG: CPT | Performed by: INTERNAL MEDICINE

## 2024-02-08 PROCEDURE — 99212 OFFICE O/P EST SF 10 MIN: CPT | Performed by: INTERNAL MEDICINE

## 2024-02-08 PROCEDURE — 3080F DIAST BP >= 90 MM HG: CPT | Performed by: INTERNAL MEDICINE

## 2024-02-08 ASSESSMENT — PATIENT HEALTH QUESTIONNAIRE - PHQ9
SUM OF ALL RESPONSES TO PHQ QUESTIONS 1-9: 0
1. LITTLE INTEREST OR PLEASURE IN DOING THINGS: 0
SUM OF ALL RESPONSES TO PHQ QUESTIONS 1-9: 0
SUM OF ALL RESPONSES TO PHQ9 QUESTIONS 1 & 2: 0
2. FEELING DOWN, DEPRESSED OR HOPELESS: 0
SUM OF ALL RESPONSES TO PHQ QUESTIONS 1-9: 0
SUM OF ALL RESPONSES TO PHQ QUESTIONS 1-9: 0

## 2024-02-08 ASSESSMENT — ENCOUNTER SYMPTOMS: BACK PAIN: 1

## 2024-02-08 NOTE — PROGRESS NOTES
\"Have you been to the ER, urgent care clinic since your last visit?  Hospitalized since your last visit?\"    YES - When: approximately 1 months ago.  Where and Why: [facility] for back pain and neck pain.    “Have you seen or consulted any other health care providers outside of [facility] since your last visit?”    NO        
pain and neck pain.       Objective:   Visit Vitals  BP (!) 153/92 (Site: Right Upper Arm, Position: Sitting, Cuff Size: Large Adult)   Pulse 99   Temp 96.9 °F (36.1 °C) (Temporal)   Resp 15   Ht 1.727 m (5' 8\")   Wt (!) 137.9 kg (304 lb)   SpO2 97%   BMI 46.22 kg/m²        Physical Exam  Cardiovascular:      Rate and Rhythm: Normal rate and regular rhythm.   Pulmonary:      Effort: Pulmonary effort is normal.      Breath sounds: Normal breath sounds.   Abdominal:      General: Abdomen is flat. Bowel sounds are normal.   Musculoskeletal:        Arms:       Comments: Tenderness anterior aspect of left shoulder.    Neurological:      Mental Status: He is alert.         Assessment:       ICD-10-CM    1. Chronic left shoulder pain  M25.512 [facility] - Manuel Gonzalez MD, Orthopedic Surgery(General/Shoulder & Elbow), [facility])    G89.29               Plan:   Return in about 3 months (around 5/8/2024) for Annual exam or before as needed.      Manuel Parker MD

## 2024-04-16 SDOH — HEALTH STABILITY: PHYSICAL HEALTH: ON AVERAGE, HOW MANY MINUTES DO YOU ENGAGE IN EXERCISE AT THIS LEVEL?: 20 MIN

## 2024-04-17 ENCOUNTER — OFFICE VISIT (OUTPATIENT)
Age: 44
End: 2024-04-17
Payer: COMMERCIAL

## 2024-04-17 VITALS — BODY MASS INDEX: 46.22 KG/M2 | HEIGHT: 68 IN | RESPIRATION RATE: 16 BRPM

## 2024-04-17 DIAGNOSIS — G89.29 CHRONIC LEFT SHOULDER PAIN: ICD-10-CM

## 2024-04-17 DIAGNOSIS — M75.102 ROTATOR CUFF SYNDROME, LEFT: Primary | ICD-10-CM

## 2024-04-17 DIAGNOSIS — M25.512 CHRONIC LEFT SHOULDER PAIN: ICD-10-CM

## 2024-04-17 DIAGNOSIS — M54.12 CERVICAL RADICULOPATHY: ICD-10-CM

## 2024-04-17 PROCEDURE — 20610 DRAIN/INJ JOINT/BURSA W/O US: CPT | Performed by: ORTHOPAEDIC SURGERY

## 2024-04-17 PROCEDURE — 99214 OFFICE O/P EST MOD 30 MIN: CPT | Performed by: ORTHOPAEDIC SURGERY

## 2024-04-17 PROCEDURE — 73030 X-RAY EXAM OF SHOULDER: CPT | Performed by: ORTHOPAEDIC SURGERY

## 2024-04-17 RX ORDER — LIDOCAINE HYDROCHLORIDE 10 MG/ML
3 INJECTION, SOLUTION INFILTRATION; PERINEURAL ONCE
Status: COMPLETED | OUTPATIENT
Start: 2024-04-17 | End: 2024-04-17

## 2024-04-17 RX ORDER — TRIAMCINOLONE ACETONIDE 40 MG/ML
40 INJECTION, SUSPENSION INTRA-ARTICULAR; INTRAMUSCULAR ONCE
Status: COMPLETED | OUTPATIENT
Start: 2024-04-17 | End: 2024-04-17

## 2024-04-17 RX ADMIN — TRIAMCINOLONE ACETONIDE 40 MG: 40 INJECTION, SUSPENSION INTRA-ARTICULAR; INTRAMUSCULAR at 09:27

## 2024-04-17 RX ADMIN — LIDOCAINE HYDROCHLORIDE 3 ML: 10 INJECTION, SOLUTION INFILTRATION; PERINEURAL at 09:26

## 2024-04-17 NOTE — PROGRESS NOTES
Cuff Pain/Weakness   Supra  -   +   Infra  -   +   Subscap -   -  Crepitus  -   -  Effusion  -   -  Warmth  -   -   Erythema  -   -  Instability  -   -  AC Joint TTP  -   -  Clavicle   Deformity -   -   TTP  -   -  Proximal Humerus   Deformity -   -   TTP  -   -  Deltoid Strength 5   5  Biceps Strength 5   5  Biceps Deformity -   -  Biceps Groove Pain -   -  Impingement Sign -   -       IMAGING:  Imaging read by myself and interpreted as follows:  4 view x-rays of the left shoulder were taken in the office today which are normal.    C-spine x-rays were reviewed which do not show any significant arthritic change    An MRI of the cervical spine was also reviewed which does not show any significant neural stenosis.  This is an MRI from January 2023    EMG of the left upper extremity by Dr. De La Rosa was also reviewed which shows mild left median neuropathy    ASSESSMENT & PLAN  Diagnosis: Left arm cervical radiculopathy, left shoulder rotator cuff pain    Grover Red has 3 years of left arm and left shoulder pain.  Some of his symptoms seem to be coming from cervical radiculopathy.  The other symptoms do seem to relate to his shoulder.  We discussed the treatment options for this today at length.  I recommend he take oral anti-inflammatories for both.  I also recommend that we try corticosteroid injection and home exercises.  I would like to see him back in 6 weeks to see how he responds.  If his shoulder pain gets better then I think most of this will likely be coming from his neck.  If his shoulder pain does not improve we would consider an MRI of his shoulder.    Prescription medication management discussed.     Plan was discussed in detail with patient, all questions were answered, and patient voiced understanding of plan.    [facility]  OFFICE PROCEDURE PROGRESS NOTE        Chart reviewed for the following:   I, Manuel Gonzalez MD, have reviewed the History, Physical and updated the Allergic

## 2024-04-19 ENCOUNTER — TELEPHONE (OUTPATIENT)
Age: 44
End: 2024-04-19

## 2024-04-19 NOTE — TELEPHONE ENCOUNTER
Patient is asking for a work status update after 4/17 visit regarding his shoulder.  Please contact him at [phone] to advise.

## 2024-04-25 ENCOUNTER — TELEPHONE (OUTPATIENT)
Facility: CLINIC | Age: 44
End: 2024-04-25

## 2024-04-25 NOTE — TELEPHONE ENCOUNTER
Patient needs letter stating he is no longer on a 15 lb weight restriction for his job. Patient needs this by Monday if possible please.

## 2024-04-26 NOTE — TELEPHONE ENCOUNTER
Patient is returning a call to the office.  States he was already seen by Ortho, but Deanna told him he needs to get the work note from hs PCP since he was then one who put him on restrictions.

## 2024-04-29 DIAGNOSIS — I10 ESSENTIAL HYPERTENSION: ICD-10-CM

## 2024-04-30 DIAGNOSIS — E78.5 HYPERLIPIDEMIA, UNSPECIFIED HYPERLIPIDEMIA TYPE: ICD-10-CM

## 2024-04-30 DIAGNOSIS — I10 ESSENTIAL HYPERTENSION: ICD-10-CM

## 2024-04-30 RX ORDER — AMLODIPINE BESYLATE 5 MG/1
5 TABLET ORAL DAILY
Qty: 90 TABLET | Refills: 0 | OUTPATIENT
Start: 2024-04-30

## 2024-04-30 RX ORDER — ATORVASTATIN CALCIUM 20 MG/1
20 TABLET, FILM COATED ORAL DAILY
Qty: 90 TABLET | Refills: 3 | OUTPATIENT
Start: 2024-04-30

## 2024-04-30 RX ORDER — LOSARTAN POTASSIUM AND HYDROCHLOROTHIAZIDE 12.5; 5 MG/1; MG/1
1 TABLET ORAL DAILY
Qty: 90 TABLET | Refills: 3 | OUTPATIENT
Start: 2024-04-30

## 2024-04-30 NOTE — TELEPHONE ENCOUNTER
Last Appointment:  2/8/2024  Future Appointments   Date Time Provider Department Center   5/16/2024 11:30 AM Manuel Alfredo MD [facility] AMB   5/29/2024  9:00 AM Manuel Gonzalez MD [facility] AMB

## 2024-06-05 ENCOUNTER — OFFICE VISIT (OUTPATIENT)
Age: 44
End: 2024-06-05
Payer: COMMERCIAL

## 2024-06-05 VITALS — BODY MASS INDEX: 46.22 KG/M2 | RESPIRATION RATE: 16 BRPM | HEIGHT: 68 IN

## 2024-06-05 DIAGNOSIS — M75.102 ROTATOR CUFF SYNDROME, LEFT: Primary | ICD-10-CM

## 2024-06-05 PROCEDURE — 99213 OFFICE O/P EST LOW 20 MIN: CPT | Performed by: ORTHOPAEDIC SURGERY

## 2024-06-05 NOTE — PROGRESS NOTES
file     Social Determinants of Health     Financial Resource Strain: Low Risk  (4/21/2023)    Overall Financial Resource Strain (CARDIA)     Difficulty of Paying Living Expenses: Not hard at all   Food Insecurity: Not on file (4/21/2023)   Transportation Needs: Unknown (4/21/2023)    PRAPARE - Transportation     Lack of Transportation (Medical): Not on file     Lack of Transportation (Non-Medical): No   Physical Activity: Insufficiently Active (4/16/2024)    Exercise Vital Sign     Days of Exercise per Week: 5 days     Minutes of Exercise per Session: 20 min   Stress: Not on file   Social Connections: Not on file   Intimate Partner Violence: Not on file   Housing Stability: Unknown (4/21/2023)    Housing Stability Vital Sign     Unable to Pay for Housing in the Last Year: Not on file     Number of Places Lived in the Last Year: Not on file     Unstable Housing in the Last Year: No       REVIEW OF SYSTEMS:      No changes from previous review of systems unless noted.    PHYSICAL EXAMINATION:  Resp 16   Ht 1.727 m (5' 8\")   BMI 46.22 kg/m²   Body mass index is 46.22 kg/m².  GENERAL: Alert and oriented x3, in no acute distress.  HEENT: Normocephalic, atraumatic.    Left shoulder with significant pain and weakness with supraspinatus rotator cuff testing.  Positive empty can.  Positive drop arm test left shoulder.    IMAGING:  Imaging read by myself and interpreted as follows:      ASSESSMENT & PLAN  Diagnosis: Left shoulder rotator cuff pain    Grover Red continues to have left shoulder rotator cuff pain.  Despite conservative treatment with home exercises focused on the shoulder as well as an injection he has not received significant pain relief.  Based on the failure of this I have recommended and ordered an MRI of his left shoulder.  I will see him back after that is done.    Prescription medication management discussed. In the interim prior to the next visit should symptoms worsen I recommend Tylenol or OTC

## 2024-06-05 NOTE — PATIENT INSTRUCTIONS
If we order a Diagnostic test (such as MRI or CT) during your office visit please see below:    If you would like your test scheduled with [facility], please call Central Scheduling at [phone] for appointment scheduling. Once scheduled, please call us back at [phone] to schedule your follow up appointment.       Coordination of Care will be calling you to schedule your diagnostic test. If you have not heard from Coordination of Care within 3 business days, please call [phone].     Once you have a date scheduled for your diagnostic test, you will need to contact our office to schedule a follow up appointment, as this is when the physician will review your diagnostic test results with you. You can contact our office to schedule appointment by phone at [phone], or you can send a message via [employer] to request an appointment.    Left shoulder MRI ordered today, 6/5/2024

## 2024-06-26 ENCOUNTER — HOSPITAL ENCOUNTER (OUTPATIENT)
Facility: HOSPITAL | Age: 44
Discharge: HOME | End: 2024-06-29
Attending: ORTHOPAEDIC SURGERY
Payer: COMMERCIAL

## 2024-06-26 DIAGNOSIS — M75.102 ROTATOR CUFF SYNDROME, LEFT: ICD-10-CM

## 2024-06-26 PROCEDURE — 73221 MRI JOINT UPR EXTREM W/O DYE: CPT

## 2024-11-08 ENCOUNTER — HOSPITAL ENCOUNTER (OUTPATIENT)
Facility: HOSPITAL | Age: 44
Setting detail: SPECIMEN
Discharge: HOME | End: 2024-11-11

## 2024-11-08 ENCOUNTER — OFFICE VISIT (OUTPATIENT)
Facility: CLINIC | Age: 44
End: 2024-11-08

## 2024-11-08 VITALS
OXYGEN SATURATION: 97 % | RESPIRATION RATE: 17 BRPM | DIASTOLIC BLOOD PRESSURE: 108 MMHG | HEIGHT: 68 IN | HEART RATE: 90 BPM | WEIGHT: 298.8 LBS | BODY MASS INDEX: 45.28 KG/M2 | TEMPERATURE: 97 F | SYSTOLIC BLOOD PRESSURE: 148 MMHG

## 2024-11-08 DIAGNOSIS — Z13.0 SCREENING FOR IRON DEFICIENCY ANEMIA: ICD-10-CM

## 2024-11-08 DIAGNOSIS — Z13.1 SCREENING FOR DIABETES MELLITUS (DM): ICD-10-CM

## 2024-11-08 DIAGNOSIS — Z87.891 PERSONAL HISTORY OF TOBACCO USE: ICD-10-CM

## 2024-11-08 DIAGNOSIS — J40 BRONCHITIS: ICD-10-CM

## 2024-11-08 DIAGNOSIS — Z13.29 SCREENING FOR THYROID DISORDER: ICD-10-CM

## 2024-11-08 DIAGNOSIS — I10 ESSENTIAL HYPERTENSION: ICD-10-CM

## 2024-11-08 DIAGNOSIS — E78.5 HYPERLIPIDEMIA, UNSPECIFIED HYPERLIPIDEMIA TYPE: ICD-10-CM

## 2024-11-08 DIAGNOSIS — Z12.5 SCREENING FOR PROSTATE CANCER: ICD-10-CM

## 2024-11-08 DIAGNOSIS — Z00.00 ANNUAL PHYSICAL EXAM: Primary | ICD-10-CM

## 2024-11-08 DIAGNOSIS — Z13.228 SCREENING FOR METABOLIC DISORDER: ICD-10-CM

## 2024-11-08 LAB — LABCORP SPECIMEN COLLECTION: NORMAL

## 2024-11-08 PROCEDURE — 99001 SPECIMEN HANDLING PT-LAB: CPT

## 2024-11-08 RX ORDER — LOSARTAN POTASSIUM AND HYDROCHLOROTHIAZIDE 12.5; 5 MG/1; MG/1
1 TABLET ORAL DAILY
Qty: 90 TABLET | Refills: 3 | Status: SHIPPED | OUTPATIENT
Start: 2024-11-08

## 2024-11-08 RX ORDER — ALBUTEROL SULFATE 90 UG/1
2 INHALANT RESPIRATORY (INHALATION) EVERY 6 HOURS PRN
Qty: 18 G | Refills: 1 | Status: SHIPPED | OUTPATIENT
Start: 2024-11-08

## 2024-11-08 RX ORDER — AMLODIPINE BESYLATE 5 MG/1
5 TABLET ORAL DAILY
Qty: 90 TABLET | Refills: 3 | Status: SHIPPED | OUTPATIENT
Start: 2024-11-08

## 2024-11-08 RX ORDER — ATORVASTATIN CALCIUM 20 MG/1
20 TABLET, FILM COATED ORAL DAILY
Qty: 90 TABLET | Refills: 3 | Status: SHIPPED | OUTPATIENT
Start: 2024-11-08

## 2024-11-08 RX ORDER — CLONIDINE HYDROCHLORIDE 0.1 MG/1
0.1 TABLET ORAL ONCE
Status: COMPLETED | OUTPATIENT
Start: 2024-11-08 | End: 2024-11-08

## 2024-11-08 RX ADMIN — CLONIDINE HYDROCHLORIDE 0.1 MG: 0.1 TABLET ORAL at 15:20

## 2024-11-08 SDOH — ECONOMIC STABILITY: INCOME INSECURITY: HOW HARD IS IT FOR YOU TO PAY FOR THE VERY BASICS LIKE FOOD, HOUSING, MEDICAL CARE, AND HEATING?: SOMEWHAT HARD

## 2024-11-08 SDOH — ECONOMIC STABILITY: FOOD INSECURITY: WITHIN THE PAST 12 MONTHS, YOU WORRIED THAT YOUR FOOD WOULD RUN OUT BEFORE YOU GOT MONEY TO BUY MORE.: SOMETIMES TRUE

## 2024-11-08 SDOH — ECONOMIC STABILITY: FOOD INSECURITY: WITHIN THE PAST 12 MONTHS, THE FOOD YOU BOUGHT JUST DIDN'T LAST AND YOU DIDN'T HAVE MONEY TO GET MORE.: SOMETIMES TRUE

## 2024-11-08 NOTE — PROGRESS NOTES
anemia  Z13.0 CBC with Auto Differential     CBC with Auto Differential      6. Screening for metabolic disorder  Z13.228 Comprehensive Metabolic Panel     Comprehensive Metabolic Panel      7. Screening for diabetes mellitus (DM)  Z13.1 Hemoglobin A1C     Hemoglobin A1C      8. Screening for thyroid disorder  Z13.29 TSH + Free T4 Panel     TSH + Free T4 Panel      9. Screening for prostate cancer  Z12.5 PSA Screening     PSA Screening      10. Personal history of tobacco use  Z87.891 MT VISIT TO DISCUSS LUNG CA SCREEN W LDCT     CT Lung Screen (Initial/Annual/Baseline)              Plan:   Return in about 4 weeks (around 12/6/2024) for Treatment monitoring BP.      Manuel Parker, MDDiscussed with the patient the current USPSTF guidelines released March 9, 2021 for screening for lung cancer.    For adults aged 50 to 80 years who have a 20 pack-year smoking history and currently smoke or have quit within the past 15 years the grade B recommendation is to:  Screen for lung cancer with low-dose computed tomography (LDCT) every year.  Stop screening once a person has not smoked for 15 years or has a health problem that limits life expectancy or the ability to have lung surgery.    The patient  reports that he has been smoking cigars and cigarettes. He started smoking about 26 years ago. He has a 6.6 pack-year smoking history. He has never been exposed to tobacco smoke. He has never used smokeless tobacco.. Discussed with patient the risks and benefits of screening, including over-diagnosis, false positive rate, and total radiation exposure.  The patient currently exhibits no signs or symptoms suggestive of lung cancer.  Discussed with patient the importance of compliance with yearly annual lung cancer screenings and willingness to undergo diagnosis and treatment if screening scan is positive.  In addition, the patient was counseled regarding the importance of remaining smoke free and/or total smoking

## 2024-11-08 NOTE — PATIENT INSTRUCTIONS
Screening can find some cancers early, when treatment may be more likely to work.  What happens after screening?  The results of your CT scan will be sent to your doctor. Someone from your care team will explain the results of your scan and answer any questions you may have. If you need any follow-up, he or she will help you understand what to do next.  After a lung cancer screening, you can go back to your usual activities right away.  A lung cancer screening test can't tell if you have lung cancer. If your results are positive, your doctor can't tell whether an abnormal finding is a harmless nodule, cancer, or something else without doing more tests.  What can you do to help prevent lung cancer?  Some lung cancers can't be prevented. But if you smoke, quitting smoking is the best step you can take to prevent lung cancer. If you want to quit, your doctor can recommend medicines or other ways to help.  Follow-up care is a key part of your treatment and safety. Be sure to make and go to all appointments, and call your doctor if you are having problems. It's also a good idea to know your test results and keep a list of the medicines you take.  Where can you learn more?  Go to https://www.[employer].net/patientEd and enter Q940 to learn more about \"Learning About Lung Cancer Screening.\"  Current as of: October 25, 2023  Content Version: 14.2  © 2024 [employer].   Care instructions adapted under license by [employer]. If you have questions about a medical condition or this instruction, always ask your healthcare professional. [employer] disclaims any warranty or liability for your use of this information.

## 2024-11-09 LAB
ALBUMIN SERPL-MCNC: 4.6 G/DL (ref 4.1–5.1)
ALP SERPL-CCNC: 62 IU/L (ref 44–121)
ALT SERPL-CCNC: 22 IU/L (ref 0–44)
AST SERPL-CCNC: 25 IU/L (ref 0–40)
BASOPHILS # BLD AUTO: 0.1 X10E3/UL (ref 0–0.2)
BASOPHILS NFR BLD AUTO: 1 %
BILIRUB SERPL-MCNC: 0.2 MG/DL (ref 0–1.2)
BUN SERPL-MCNC: 13 MG/DL (ref 6–24)
BUN/CREAT SERPL: 12 (ref 9–20)
CALCIUM SERPL-MCNC: 9.8 MG/DL (ref 8.7–10.2)
CHLORIDE SERPL-SCNC: 103 MMOL/L (ref 96–106)
CHOLEST SERPL-MCNC: 202 MG/DL (ref 100–199)
CO2 SERPL-SCNC: 21 MMOL/L (ref 20–29)
CREAT SERPL-MCNC: 1.13 MG/DL (ref 0.76–1.27)
EGFRCR SERPLBLD CKD-EPI 2021: 83 ML/MIN/1.73
EOSINOPHIL # BLD AUTO: 0.1 X10E3/UL (ref 0–0.4)
EOSINOPHIL NFR BLD AUTO: 1 %
ERYTHROCYTE [DISTWIDTH] IN BLOOD BY AUTOMATED COUNT: 13 % (ref 11.6–15.4)
GLOBULIN SER CALC-MCNC: 2.7 G/DL (ref 1.5–4.5)
GLUCOSE SERPL-MCNC: 76 MG/DL (ref 70–99)
HBA1C MFR BLD: 5.8 % (ref 4.8–5.6)
HCT VFR BLD AUTO: 47.7 % (ref 37.5–51)
HDLC SERPL-MCNC: 42 MG/DL
HGB BLD-MCNC: 15.5 G/DL (ref 13–17.7)
IMM GRANULOCYTES # BLD AUTO: 0 X10E3/UL (ref 0–0.1)
IMM GRANULOCYTES NFR BLD AUTO: 0 %
LDLC SERPL CALC-MCNC: 132 MG/DL (ref 0–99)
LYMPHOCYTES # BLD AUTO: 2.5 X10E3/UL (ref 0.7–3.1)
LYMPHOCYTES NFR BLD AUTO: 26 %
MCH RBC QN AUTO: 27 PG (ref 26.6–33)
MCHC RBC AUTO-ENTMCNC: 32.5 G/DL (ref 31.5–35.7)
MCV RBC AUTO: 83 FL (ref 79–97)
MONOCYTES # BLD AUTO: 0.8 X10E3/UL (ref 0.1–0.9)
MONOCYTES NFR BLD AUTO: 8 %
NEUTROPHILS # BLD AUTO: 6.2 X10E3/UL (ref 1.4–7)
NEUTROPHILS NFR BLD AUTO: 64 %
PLATELET # BLD AUTO: 333 X10E3/UL (ref 150–450)
POTASSIUM SERPL-SCNC: 5 MMOL/L (ref 3.5–5.2)
PROT SERPL-MCNC: 7.3 G/DL (ref 6–8.5)
PSA SERPL-MCNC: 0.5 NG/ML (ref 0–4)
RBC # BLD AUTO: 5.74 X10E6/UL (ref 4.14–5.8)
SODIUM SERPL-SCNC: 139 MMOL/L (ref 134–144)
SPECIMEN STATUS REPORT: NORMAL
T4 FREE SERPL-MCNC: 1.34 NG/DL (ref 0.82–1.77)
TRIGL SERPL-MCNC: 156 MG/DL (ref 0–149)
TSH SERPL DL<=0.005 MIU/L-ACNC: 0.63 UIU/ML (ref 0.45–4.5)
VLDLC SERPL CALC-MCNC: 28 MG/DL (ref 5–40)
WBC # BLD AUTO: 9.7 X10E3/UL (ref 3.4–10.8)

## 2024-11-09 ASSESSMENT — ENCOUNTER SYMPTOMS
ABDOMINAL PAIN: 0
DIARRHEA: 0
CONSTIPATION: 0
COUGH: 0
BLOOD IN STOOL: 0
SHORTNESS OF BREATH: 0

## 2024-11-29 ENCOUNTER — TELEPHONE (OUTPATIENT)
Facility: CLINIC | Age: 44
End: 2024-11-29

## 2024-11-29 NOTE — TELEPHONE ENCOUNTER
Laurence is calling to inform  that the CT Lung screen for patient was denied and the Insurance company is requesting is requesting a peer to peer.    #[phone]    Case# [id number]    Laurence's Fax#[phone]

## 2024-12-06 ENCOUNTER — OFFICE VISIT (OUTPATIENT)
Facility: CLINIC | Age: 44
End: 2024-12-06
Payer: COMMERCIAL

## 2024-12-06 VITALS
RESPIRATION RATE: 17 BRPM | TEMPERATURE: 97.2 F | HEIGHT: 68 IN | OXYGEN SATURATION: 97 % | HEART RATE: 84 BPM | DIASTOLIC BLOOD PRESSURE: 78 MMHG | WEIGHT: 296.4 LBS | BODY MASS INDEX: 44.92 KG/M2 | SYSTOLIC BLOOD PRESSURE: 129 MMHG

## 2024-12-06 DIAGNOSIS — Z72.0 TOBACCO ABUSE DISORDER: ICD-10-CM

## 2024-12-06 DIAGNOSIS — I10 ESSENTIAL HYPERTENSION: Primary | ICD-10-CM

## 2024-12-06 PROCEDURE — 3078F DIAST BP <80 MM HG: CPT | Performed by: INTERNAL MEDICINE

## 2024-12-06 PROCEDURE — 99213 OFFICE O/P EST LOW 20 MIN: CPT | Performed by: INTERNAL MEDICINE

## 2024-12-06 PROCEDURE — 3074F SYST BP LT 130 MM HG: CPT | Performed by: INTERNAL MEDICINE

## 2024-12-06 ASSESSMENT — ENCOUNTER SYMPTOMS
SHORTNESS OF BREATH: 0
ABDOMINAL PAIN: 0
BACK PAIN: 1

## 2024-12-06 NOTE — PROGRESS NOTES
Subjective:      Patient ID: Grover Red is a 44 y.o. male.    Follow up    Patient is compliant with medications.  Blood pressure is well-controlled now.  Will continue same regimen.  He has an appointment for CT scan low-dose screening for lung cancer.  He does not meet criteria for testing, he is a 44 years old.  I asked the patient to contact insurance and ask if he will be covered.  If this is not covered then because of costs, patient would like to do the chest x-ray instead.  If patient ever presents any symptom or sign concerning, will order a CT chest without contrast.  So far patient is asymptomatic.  Advised to stop smoking completely.        Essential hypertension - controlled.  On losartan/hydrochlorothiazide, Amlodipine.     Hyperlipidemia  ASCVD recommend mod/high dose statins.  Started atorvastatin 20 mg.     Left shoulder, likely nerve compression related  Gabapentin and flexeril did not help. Now following spine doctor.      Tobacco abuse disorder/former smoker  Started to stop smoking in 10/2022.  1 ppd (25 years total). Now smoking 1/2 ppd.   Will go down to 1/2 pp week from today. No willing to try meds or nicotine patches/gum yet.     Obesity morbid  BMI 46. 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  He will ask for advice.  Information provided.         PAST MEDICAL HISTORY  Medical: as listed.  Surgical: hip surgery bilaterally.  Allergies: denied.  Medication: as listed.  Family: father and grandfather COPD and lung cancer and smoker.  Work: .  Social: Tobacco yes, [place] socially, recreational drugs denied.  Sexual: , 3 children, STI  denied.      Hypertension  Pertinent negatives include no chest pain, headaches or shortness of breath.   Hyperlipidemia  Pertinent negatives include no chest pain or shortness of breath.   Shortness of Breath  Pertinent negatives include no abdominal pain, chest pain, fever or headaches.   Neck Pain   Pertinent negatives include no chest pain, fever or

## 2024-12-06 NOTE — TELEPHONE ENCOUNTER
Spoke with the pt and informed he plans to come in to have the Xray on Monday. Pt voices no further concerns at this time.

## 2024-12-13 ENCOUNTER — TELEPHONE (OUTPATIENT)
Facility: CLINIC | Age: 44
End: 2024-12-13

## 2024-12-13 NOTE — TELEPHONE ENCOUNTER
Laurence ROBIN from authorization for CT lung Sceen.Patient has a appt for 12/16/2024. Laurence stated that Dr Tovar is needed to contact insurance at [phone] for Case # [id number] for peer to peer. Laurence # [phone] ext [phone], fax # [phone]

## 2024-12-13 NOTE — TELEPHONE ENCOUNTER
Called [phone] and spoke with Leti. Will need to contact the insurance plan directly to see what's needed and to submit appeal. Leti states doing a peer to peer will only be to discussed the denial not to possibly change the decision. Will call pts insurance [phone]

## 2024-12-23 DIAGNOSIS — J40 BRONCHITIS: ICD-10-CM

## 2024-12-23 NOTE — TELEPHONE ENCOUNTER
Last Appointment:  12/6/2024  Future Appointments   Date Time Provider Department Center   6/13/2025  9:00 AM Manuel Alfredo MD [facility]

## 2024-12-24 RX ORDER — ALBUTEROL SULFATE 90 UG/1
2 INHALANT RESPIRATORY (INHALATION) EVERY 6 HOURS PRN
Qty: 18 EACH | Refills: 1 | Status: SHIPPED | OUTPATIENT
Start: 2024-12-24

## 2025-02-23 DIAGNOSIS — J40 BRONCHITIS: ICD-10-CM

## 2025-02-24 RX ORDER — ALBUTEROL SULFATE 90 UG/1
2 INHALANT RESPIRATORY (INHALATION) EVERY 6 HOURS PRN
Qty: 18 EACH | Refills: 1 | Status: SHIPPED | OUTPATIENT
Start: 2025-02-24